# Patient Record
Sex: FEMALE
[De-identification: names, ages, dates, MRNs, and addresses within clinical notes are randomized per-mention and may not be internally consistent; named-entity substitution may affect disease eponyms.]

---

## 2019-02-22 PROBLEM — Z00.00 ENCOUNTER FOR PREVENTIVE HEALTH EXAMINATION: Status: ACTIVE | Noted: 2019-02-22

## 2019-04-17 ENCOUNTER — APPOINTMENT (OUTPATIENT)
Dept: OTOLARYNGOLOGY | Facility: CLINIC | Age: 69
End: 2019-04-17

## 2019-04-17 ENCOUNTER — APPOINTMENT (OUTPATIENT)
Dept: OTOLARYNGOLOGY | Facility: CLINIC | Age: 69
End: 2019-04-17
Payer: MEDICAID

## 2019-04-17 VITALS
BODY MASS INDEX: 27.25 KG/M2 | WEIGHT: 184 LBS | HEART RATE: 74 BPM | DIASTOLIC BLOOD PRESSURE: 78 MMHG | SYSTOLIC BLOOD PRESSURE: 137 MMHG | HEIGHT: 69 IN

## 2019-04-17 DIAGNOSIS — Z78.9 OTHER SPECIFIED HEALTH STATUS: ICD-10-CM

## 2019-04-17 DIAGNOSIS — Z82.49 FAMILY HISTORY OF ISCHEMIC HEART DISEASE AND OTHER DISEASES OF THE CIRCULATORY SYSTEM: ICD-10-CM

## 2019-04-17 DIAGNOSIS — Z86.79 PERSONAL HISTORY OF OTHER DISEASES OF THE CIRCULATORY SYSTEM: ICD-10-CM

## 2019-04-17 DIAGNOSIS — Z86.39 PERSONAL HISTORY OF OTHER ENDOCRINE, NUTRITIONAL AND METABOLIC DISEASE: ICD-10-CM

## 2019-04-17 DIAGNOSIS — D35.2 BENIGN NEOPLASM OF PITUITARY GLAND: ICD-10-CM

## 2019-04-17 DIAGNOSIS — Z83.3 FAMILY HISTORY OF DIABETES MELLITUS: ICD-10-CM

## 2019-04-17 DIAGNOSIS — J34.89 OTHER SPECIFIED DISORDERS OF NOSE AND NASAL SINUSES: ICD-10-CM

## 2019-04-17 PROCEDURE — 31231 NASAL ENDOSCOPY DX: CPT

## 2019-04-17 PROCEDURE — 99204 OFFICE O/P NEW MOD 45 MIN: CPT | Mod: 25

## 2019-04-17 RX ORDER — FLUTICASONE PROPIONATE 50 UG/1
50 SPRAY, METERED NASAL DAILY
Qty: 1 | Refills: 3 | Status: ACTIVE | COMMUNITY
Start: 2019-04-17 | End: 1900-01-01

## 2019-04-18 PROBLEM — Z86.79 HISTORY OF CORONARY ARTERY DISEASE: Status: RESOLVED | Noted: 2019-04-17 | Resolved: 2019-04-18

## 2019-04-18 PROBLEM — Z86.79 HISTORY OF HYPERTENSION: Status: RESOLVED | Noted: 2019-04-17 | Resolved: 2019-04-18

## 2019-04-18 PROBLEM — Z86.39 HISTORY OF DIABETES MELLITUS: Status: RESOLVED | Noted: 2019-04-17 | Resolved: 2019-04-18

## 2019-04-18 PROBLEM — Z78.9 CONSUMES ALCOHOL: Status: ACTIVE | Noted: 2019-04-17

## 2019-04-18 RX ORDER — GLIPIZIDE 5 MG/1
5 TABLET ORAL
Refills: 0 | Status: ACTIVE | COMMUNITY

## 2019-04-18 RX ORDER — METOPROLOL TARTRATE 25 MG/1
25 TABLET, FILM COATED ORAL
Refills: 0 | Status: ACTIVE | COMMUNITY

## 2019-04-18 RX ORDER — LISINOPRIL AND HYDROCHLOROTHIAZIDE TABLETS 20; 25 MG/1; MG/1
20-25 TABLET ORAL
Refills: 0 | Status: ACTIVE | COMMUNITY

## 2019-04-18 RX ORDER — ATORVASTATIN CALCIUM 40 MG/1
40 TABLET, FILM COATED ORAL
Refills: 0 | Status: ACTIVE | COMMUNITY

## 2019-04-18 RX ORDER — NIFEDIPINE 90 MG
90 TABLET, EXTENDED RELEASE ORAL
Refills: 0 | Status: ACTIVE | COMMUNITY

## 2019-05-15 PROBLEM — D35.2 PITUITARY ADENOMA: Status: ACTIVE | Noted: 2019-05-15

## 2019-05-15 PROBLEM — Z82.49 FAMILY HISTORY OF CORONARY ARTERY DISEASE: Status: ACTIVE | Noted: 2019-04-17

## 2019-05-15 PROBLEM — Z83.3 FAMILY HISTORY OF DIABETES MELLITUS: Status: ACTIVE | Noted: 2019-04-17

## 2019-05-15 PROBLEM — Z82.49 FAMILY HISTORY OF HYPERTENSION: Status: ACTIVE | Noted: 2019-04-17

## 2019-05-15 PROBLEM — J34.89 NASAL OBSTRUCTION: Status: ACTIVE | Noted: 2019-05-15

## 2019-05-15 NOTE — ASSESSMENT
[FreeTextEntry1] : Ms. HOWARD has mass blocking both posterior choanae and seems to be emanating from the sphenoids bilaterally.  MRI is characteristic for pituitary adenoma, which has extension to cavernous sinus.  \par Her endocrine evaluation is ongoing - she reports that she will have a cortisol test next week.\par She is to have ophtho eval tomorrow.\par Nasal obstruction is from the mass.\par \par Plan:\par -- Fluticasone nasal spray \par -- once endocrine and ophtho workup complete, can discuss pituitary surgery as indicated\par \par Return in 1 month

## 2019-05-15 NOTE — PROCEDURE
[Osteomeatal Pathology] : osteomeatal pathology [FreeTextEntry6] : \par Indication: nasopharyngeal mass\par -Verbal consent was obtained from patient prior to exam. \par - Balaji-Synephrine spray applied to nose bilaterally.\par Nasal endoscopy was performed with flexible scope.\par Findings: \par -- Inferior turbinates normal  bilateral.\par -- Septum was mildly deviated to the left .\par -- Mucous clear bilateral\par -- Middle and superior turbinates normal bilateral\par -- Middle meatus clear bilateral\par -- Smooth mass filling posterior choana bilateral and seem to emanate from SER; no pulsation or friability\par -- Eustachian orifices were clear bilateral\par \par The patient tolerated the procedure well.\par

## 2019-05-15 NOTE — HISTORY OF PRESENT ILLNESS
[de-identified] : I was pleased to evaluate this 68 year old woman who was referred by Dr. Sharif for nasopharyngeal mass. \par \par Ms. Howard presents with nasal congestion that started in 2018; initially both nostrils,  now mostly left.  She thought the cause was allergies or a cold, but sx did not resolve. \par In 2019, she saw Dr. Sharif and was told she had a nasal mass. \par Denies facial pressure/pain, headaches, nasal d/c, postnasal drip and vision change.\par Mouth breathing is uncomfortable, especially when she sleeps. Very dry mouth; she has to keep water by her bed. \par Sometimes she has mild SOB and when eating has to pause for breath. \par Hx of pituitary mass noted after mini stroke , was told mass was the "size of a pea" and was being monitored at [facility] (2 week admission). 1st stroke sx included slurred speech. Second stroke in  with imbalance. No residual sx following strokes. \par Plans to see ophthalmologist (tomorrow) and have labs done soon. \par Was told she needs a thyroid biopsy also.\par \par \par The medical records and images were reviewed and include the following:\par MRI ORBITS, FACE OR NECK WITHOUT CONTRAST  (2019) at [facility]:\par - COMPARISON:Attention is made to radiographs of cervical spine performed on 2018.\par FINDINGS:    \par - The frontal sinuses are well aerated.\par - There is mild mucosal thickening in the maxillary sinuses, right greater than left and throughout the ethmoid air cells. The nasal septum is broadly deviated towards the left.\par - There is a soft tissue mass expanding the sella turcica with extension into the body of the sphenoid bone, clivus and bilateral occipital condyles and petrous apices. There is extension of the mass into both cavernous sinuses, right greater than left with encasement of the internal carotid arteries. The soft tissue mass appears to extend anteriorly into the nasopharynx and nasal cavity with obstruction of the airway. The pituitary stalk inserts upon the superior margin of the soft tissue mass, to the left of midline.  There is no suprasellar extension of the mass.\par - There are patchy and punctate foci of increased signal intensity, on the FLAIR/T2-weighted sequences, in the periventricular and subcortical white matter.\par - There is a 10.6 mm cavernous malformation in the right half of the angie.\par IMPRESSION:\par - Soft tissue mass expanding the sella turcica with extension into the body of the sphenoid bone, clivus and petrous apices and occipital condyles and into the nasopharynx and posterior nasal cavity. There is extension into bilateral cavernous sinuses, right greater than left with encasement of the internal carotid arteries. The imaging characteristics favor a pituitary macroadenoma.\par - 10.6 mm cavernous malformation in the right half of the angie.\par - Mild inflammatory ethmoid and maxillary sinus changes.\par - Nasal septal deviation towards the left.\par - Addendum: 2019:  The soft tissue mass centered upon the sella turcica/sphenoid bone and measures approximately 6.4 cm (TV) x 4.2 Cm (AP) x 4.1 cm (CC).\par (Images were reviewed.) \par \par ULTRASOUND THYROID NECK (2018) at [facility]:\par - RIGHT LOBE 3.2 x 0.8 x 1.2 cm, slightly heterogeneous\par    -- .0.4 x 0.2 x 0.3 cm vague ill-defined hypoechogenicity in mid pole; no calcification or hyperemia.\par - LEFT LOBE: 3.8 x 1.4 x 1 cm  mildly heterogeneous\par    --   0.5 x 0.3 x 0.4 cm hypoechoic well-defined nodule with a dot of echogenic focus, probably representing a colloid nodule, in mid pole\par    --  1.1 x 0.5 x 0.7 cm well-defined hypervascular noncalcified nodule in anterior superficial lower pole area\par - ISTHMUS:  0.2 cm without discrete nodules/cysts.\par - LYMPH NODES: There is no lymph node enlargement.\par IMPRESSION:\par - Left lower level thyroid lobe nodule. 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

## 2019-05-15 NOTE — CONSULT LETTER
[Dear  ___] : Dear  [unfilled], [Consult Letter:] : I had the pleasure of evaluating your patient, [unfilled]. [Consult Closing:] : Thank you very much for allowing me to participate in the care of this patient.  If you have any questions, please do not hesitate to contact me. [Sincerely,] : Sincerely, [DrShekhar  ___] : Dr. CRESPO [DrShekhar ___] : Dr. CRESPO [FreeTextEntry1] : \par \par Enclosed please find my office notes for April 17, 2019. \par \par  [FreeTextEntry2] : Hina Sharif MD\par [address]\[place] [address]\par \par \par \par \par \par \par \par \par \par \par \par \par \par \par \par \par \par \par \par \par \par \par \par \par \par \par \par \par \par \par \par \par \par \par \par \par \par \par \par \par \par \par \par \par \par \par \par \par \par \par \par \par \par \par \par \par \par \par \par \par \par \par \par \par \par \par \par © 2019 HERE, © [employer]\par \par \par \par \par \par \par \par \par \par \par \par \par \par Dr. Hina Sharif\par [address]\[place] [address] [FreeTextEntry3] : Jeny Holliday MD \par Otolaryngology, Head and Neck Surgery\par

## 2019-05-15 NOTE — PHYSICAL EXAM
[Nasal Endoscopy Performed] : nasal endoscopy was performed, see procedure section for findings [Midline] : trachea located in midline position [Normal] : no rashes [de-identified] : 1+ bilateral  [de-identified] : Carotid pulses 2+ bilateral.

## 2019-05-30 ENCOUNTER — APPOINTMENT (OUTPATIENT)
Dept: OTOLARYNGOLOGY | Facility: CLINIC | Age: 69
End: 2019-05-30